# Patient Record
Sex: MALE | Race: WHITE | NOT HISPANIC OR LATINO
[De-identification: names, ages, dates, MRNs, and addresses within clinical notes are randomized per-mention and may not be internally consistent; named-entity substitution may affect disease eponyms.]

---

## 2020-05-13 ENCOUNTER — TRANSCRIPTION ENCOUNTER (OUTPATIENT)
Age: 35
End: 2020-05-13

## 2020-05-14 ENCOUNTER — OUTPATIENT (OUTPATIENT)
Dept: OUTPATIENT SERVICES | Facility: HOSPITAL | Age: 35
LOS: 1 days | End: 2020-05-14

## 2020-05-14 DIAGNOSIS — Z11.59 ENCOUNTER FOR SCREENING FOR OTHER VIRAL DISEASES: ICD-10-CM

## 2020-05-15 DIAGNOSIS — Z11.59 ENCOUNTER FOR SCREENING FOR OTHER VIRAL DISEASES: ICD-10-CM

## 2020-07-20 ENCOUNTER — TRANSCRIPTION ENCOUNTER (OUTPATIENT)
Age: 35
End: 2020-07-20

## 2020-09-27 ENCOUNTER — TRANSCRIPTION ENCOUNTER (OUTPATIENT)
Age: 35
End: 2020-09-27

## 2021-10-11 ENCOUNTER — APPOINTMENT (OUTPATIENT)
Dept: INTERNAL MEDICINE | Facility: CLINIC | Age: 36
End: 2021-10-11
Payer: COMMERCIAL

## 2021-10-11 ENCOUNTER — NON-APPOINTMENT (OUTPATIENT)
Age: 36
End: 2021-10-11

## 2021-10-11 VITALS
DIASTOLIC BLOOD PRESSURE: 100 MMHG | HEART RATE: 82 BPM | RESPIRATION RATE: 14 BRPM | SYSTOLIC BLOOD PRESSURE: 154 MMHG | HEIGHT: 72 IN | WEIGHT: 277 LBS | TEMPERATURE: 97.6 F | BODY MASS INDEX: 37.52 KG/M2 | OXYGEN SATURATION: 98 %

## 2021-10-11 VITALS — SYSTOLIC BLOOD PRESSURE: 164 MMHG | DIASTOLIC BLOOD PRESSURE: 100 MMHG

## 2021-10-11 DIAGNOSIS — Z82.49 FAMILY HISTORY OF ISCHEMIC HEART DISEASE AND OTHER DISEASES OF THE CIRCULATORY SYSTEM: ICD-10-CM

## 2021-10-11 DIAGNOSIS — Z78.9 OTHER SPECIFIED HEALTH STATUS: ICD-10-CM

## 2021-10-11 PROCEDURE — 99385 PREV VISIT NEW AGE 18-39: CPT | Mod: 25

## 2021-10-11 PROCEDURE — 93000 ELECTROCARDIOGRAM COMPLETE: CPT | Mod: 59

## 2021-10-13 LAB
ALBUMIN SERPL ELPH-MCNC: 5 G/DL
ALP BLD-CCNC: 71 U/L
ALT SERPL-CCNC: 38 U/L
ANION GAP SERPL CALC-SCNC: 15 MMOL/L
APPEARANCE: CLEAR
AST SERPL-CCNC: 18 U/L
BASOPHILS # BLD AUTO: 0.07 K/UL
BASOPHILS NFR BLD AUTO: 0.8 %
BILIRUB SERPL-MCNC: 0.2 MG/DL
BILIRUBIN URINE: NEGATIVE
BLOOD URINE: NEGATIVE
BUN SERPL-MCNC: 13 MG/DL
CALCIUM SERPL-MCNC: 9.8 MG/DL
CHLORIDE SERPL-SCNC: 103 MMOL/L
CHOLEST SERPL-MCNC: 225 MG/DL
CO2 SERPL-SCNC: 25 MMOL/L
COLOR: NORMAL
COVID-19 NUCLEOCAPSID  GAM ANTIBODY INTERPRETATION: NEGATIVE
COVID-19 SPIKE DOMAIN ANTIBODY INTERPRETATION: POSITIVE
CREAT SERPL-MCNC: 0.93 MG/DL
EOSINOPHIL # BLD AUTO: 0.24 K/UL
EOSINOPHIL NFR BLD AUTO: 2.7 %
ESTIMATED AVERAGE GLUCOSE: 126 MG/DL
FOLATE SERPL-MCNC: 4.8 NG/ML
GLUCOSE QUALITATIVE U: NEGATIVE
GLUCOSE SERPL-MCNC: 81 MG/DL
HBA1C MFR BLD HPLC: 6 %
HCT VFR BLD CALC: 48.4 %
HDLC SERPL-MCNC: 47 MG/DL
HGB BLD-MCNC: 15.3 G/DL
IMM GRANULOCYTES NFR BLD AUTO: 0.4 %
KETONES URINE: NEGATIVE
LDLC SERPL CALC-MCNC: 117 MG/DL
LEUKOCYTE ESTERASE URINE: NEGATIVE
LYMPHOCYTES # BLD AUTO: 2.65 K/UL
LYMPHOCYTES NFR BLD AUTO: 29.8 %
MAN DIFF?: NORMAL
MCHC RBC-ENTMCNC: 29.3 PG
MCHC RBC-ENTMCNC: 31.6 GM/DL
MCV RBC AUTO: 92.5 FL
MONOCYTES # BLD AUTO: 1.02 K/UL
MONOCYTES NFR BLD AUTO: 11.5 %
NEUTROPHILS # BLD AUTO: 4.88 K/UL
NEUTROPHILS NFR BLD AUTO: 54.8 %
NITRITE URINE: NEGATIVE
NONHDLC SERPL-MCNC: 178 MG/DL
PH URINE: 6
PLATELET # BLD AUTO: 291 K/UL
POTASSIUM SERPL-SCNC: 4.8 MMOL/L
PROT SERPL-MCNC: 7.6 G/DL
PROTEIN URINE: NORMAL
PSA SERPL-MCNC: 1.49 NG/ML
RBC # BLD: 5.23 M/UL
RBC # FLD: 13.1 %
SARS-COV-2 AB SERPL IA-ACNC: 7.49 U/ML
SARS-COV-2 AB SERPL QL IA: 0.07 INDEX
SODIUM SERPL-SCNC: 143 MMOL/L
SPECIFIC GRAVITY URINE: 1.02
TRIGL SERPL-MCNC: 303 MG/DL
TSH SERPL-ACNC: 2.49 UIU/ML
UROBILINOGEN URINE: NORMAL
VIT B12 SERPL-MCNC: 501 PG/ML
WBC # FLD AUTO: 8.9 K/UL

## 2021-10-13 NOTE — HEALTH RISK ASSESSMENT
[Good] : ~his/her~ current health as good [Very Good] : ~his/her~  mood as very good [Yes] : Yes [] : No [de-identified] : social [de-identified] : Not exercising much

## 2021-10-13 NOTE — PLAN
[FreeTextEntry1] : Start Lisinopril 10mg QD\par See [facility]\par RTO 2 weeks

## 2021-10-13 NOTE — HISTORY OF PRESENT ILLNESS
[FreeTextEntry1] : Here for CPE\par Pt is c/o chronic right ankle/foot pain since spraining his ankle January 2021.\par Also c/o chronic Right shoulder pain\par Pt has received J&J COVID vaccine. [de-identified] : Doesn't smoke. Social alcohol.\par Doesn't exercise much.\par

## 2021-10-26 ENCOUNTER — APPOINTMENT (OUTPATIENT)
Dept: INTERNAL MEDICINE | Facility: CLINIC | Age: 36
End: 2021-10-26
Payer: COMMERCIAL

## 2021-10-26 VITALS — DIASTOLIC BLOOD PRESSURE: 70 MMHG | SYSTOLIC BLOOD PRESSURE: 110 MMHG

## 2021-10-26 VITALS
HEART RATE: 81 BPM | RESPIRATION RATE: 15 BRPM | OXYGEN SATURATION: 97 % | DIASTOLIC BLOOD PRESSURE: 90 MMHG | SYSTOLIC BLOOD PRESSURE: 120 MMHG | TEMPERATURE: 97.3 F

## 2021-10-26 DIAGNOSIS — E78.2 MIXED HYPERLIPIDEMIA: ICD-10-CM

## 2021-10-26 PROCEDURE — 99213 OFFICE O/P EST LOW 20 MIN: CPT

## 2021-10-26 NOTE — PLAN
[FreeTextEntry1] : Continue Lisinopril 10mg QD\par Trial of improved diet/exercise and weight loss to imrove A1C and cholesterol\par RTO 3 months

## 2021-10-26 NOTE — HISTORY OF PRESENT ILLNESS
[FreeTextEntry1] : Here for follow-up regarding HTN, elevated A1C, and elevated cholesterol'\par Lisinopril 10mg QD was started at the last visit. [de-identified] : Overall feeling well

## 2021-11-03 ENCOUNTER — APPOINTMENT (OUTPATIENT)
Dept: ORTHOPEDIC SURGERY | Facility: CLINIC | Age: 36
End: 2021-11-03
Payer: COMMERCIAL

## 2021-11-03 ENCOUNTER — NON-APPOINTMENT (OUTPATIENT)
Age: 36
End: 2021-11-03

## 2021-11-03 DIAGNOSIS — M79.671 PAIN IN RIGHT FOOT: ICD-10-CM

## 2021-11-03 PROCEDURE — 99204 OFFICE O/P NEW MOD 45 MIN: CPT

## 2021-11-03 PROCEDURE — 73630 X-RAY EXAM OF FOOT: CPT | Mod: RT

## 2021-11-03 NOTE — HISTORY OF PRESENT ILLNESS
[FreeTextEntry1] : 11/3/2021: MARI PERRY is a 36 year old male presenting for an initial evaluation of right foot pain ongoing for the past 3 months. The patient’s pain is noted to be a 4/10, with an intermittent timing. He is concerned with swelling to the ankle. Denies any acute trauma or injury to the foot. He does confirm a prior sprain to the ankle approximately four years prior and was treated conservatively. The patient confirms a medical history of Pre-diabetes with his most recent A1c of 6.0. He presents to the clinic wearing [employer] sneakers. No other complaints. \par

## 2021-11-03 NOTE — PHYSICAL EXAM
[de-identified] : General: Alert and oriented x3. In no acute distress. Pleasant in nature with a normal affect. No apparent respiratory distress.\par \par Right Foot Exam\par Skin: Clean, dry, intact\par Inspection: No obvious malalignment, no masses, no swelling, no effusion. Pes planus. \par Pulses: 2+ DP/PT pulses\par ROM: FOOT Full  ROM of digits, ANKLE 10 degrees of dorsiflexion, 40 degrees of plantarflexion, 10 degrees of subtalar motion.\par Painful ROM: None\par Tenderness: + Medial talus pain. No tenderness over the medial malleolus, No tenderness over the lateral malleolus, no CFL/ATFL/PTFL pain, no deltoid ligament pain. No heel pain. No Achilles tenderness. No 5th metatarsal pain. No pain to the LisFranc joint. No ttp over the posterior tibial tendon.\par Stability: Negative anterior/posterior drawer.\par Strength: 5/5 ADD/ABD/TA/GS/EHL/FHL/EDL\par Neuro: Sensation in tact to light touch throughout\par Additional tests: Negative Mortons test, negative tarsal tunnel tinels, negative single heel rise.			 [de-identified] : 3V of the right foot were ordered, obtained, and reviewed by me today, 11/03/2021, revealed: No acute fractures. Pes planus. \par

## 2021-11-03 NOTE — ADDENDUM
[FreeTextEntry1] : I, Susana Saul, acted solely as a scribe for Dr. Micky Kaufman on this date 11/03/2021.\par \par All medical record entries made by the Scribe were at my, Dr. Micky Kaufman, direction and personally dictated by me on 11/03/2021 . I have reviewed the chart and agree that the record accurately reflects my personal performance of the history, physical exam, assessment and plan. I have also personally directed, reviewed, and agreed with the chart.	\par

## 2021-11-03 NOTE — DISCUSSION/SUMMARY
[de-identified] : Today I had a lengthy discussion with the patient regarding their right foot pain. I have addressed all the patient's concerns surrounding the pathology of their condition. XR imaging was completed in office today and results were reviewed with the patient. I recommend the patient undergo a course of physical therapy for the right foot and ankle  2-3 times a week for a total of 6-8 weeks. A prescription was given for the physical therapy today. I recommended that the patient utilize an ASO brace. The patient was fitted for the ASO brace in the office today.\par \par A discussion was also had about utilizing custom orthotics versus OTC orthotics. The patient was educated about custom orthotics in the office today.\par 	\par Further, I recommend that the patient utilize ice, NSAIDs, and heat PRN. They can also elevate their right foot above the level of the heart.		\par \par The patient understood and verbally agreed to the treatment plan. All of their questions were answered and they were satisfied with the visit. The patient should call the office if they have any questions or experience worsening symptoms. I would like to see the patient back in the office in 2-3 months PRN to reassess their condition. 				\par

## 2022-01-25 ENCOUNTER — APPOINTMENT (OUTPATIENT)
Dept: INTERNAL MEDICINE | Facility: CLINIC | Age: 37
End: 2022-01-25
Payer: COMMERCIAL

## 2022-01-25 VITALS
BODY MASS INDEX: 33.46 KG/M2 | WEIGHT: 247 LBS | OXYGEN SATURATION: 97 % | RESPIRATION RATE: 14 BRPM | HEART RATE: 82 BPM | DIASTOLIC BLOOD PRESSURE: 70 MMHG | HEIGHT: 72 IN | SYSTOLIC BLOOD PRESSURE: 116 MMHG

## 2022-01-25 VITALS — DIASTOLIC BLOOD PRESSURE: 74 MMHG | SYSTOLIC BLOOD PRESSURE: 116 MMHG

## 2022-01-25 PROCEDURE — 99213 OFFICE O/P EST LOW 20 MIN: CPT

## 2022-01-25 NOTE — HISTORY OF PRESENT ILLNESS
[FreeTextEntry1] : Here for follow-up regarding HTN.\par Lisinopril 10mg QD was started at the last visit [de-identified] : Feeling well

## 2022-02-17 ENCOUNTER — APPOINTMENT (OUTPATIENT)
Dept: ORTHOPEDIC SURGERY | Facility: CLINIC | Age: 37
End: 2022-02-17
Payer: COMMERCIAL

## 2022-02-17 DIAGNOSIS — M76.821 POSTERIOR TIBIAL TENDINITIS, RIGHT LEG: ICD-10-CM

## 2022-02-17 DIAGNOSIS — M21.41 FLAT FOOT [PES PLANUS] (ACQUIRED), RIGHT FOOT: ICD-10-CM

## 2022-02-17 DIAGNOSIS — M79.671 PAIN IN RIGHT FOOT: ICD-10-CM

## 2022-02-17 PROCEDURE — 99212 OFFICE O/P EST SF 10 MIN: CPT

## 2022-02-17 NOTE — DISCUSSION/SUMMARY
[de-identified] : Assessment: Right foot injury, posterior tib tendonitis.\par \par Plan:\par 1. Home exercise and stretching program.  ASO brace with Dr. Raymond's arch support orthotics.\par 2. NSAIDs/Tylenol as needed for pain. \par 3. Return to normal activities as tolerated. \par 4. Continue with ice and heat therapy. \par 5. All questions answered.  Follow-up as needed. If pain persists consider advanced imaging in the future.  The patient understood the treatment plan.

## 2022-02-17 NOTE — PHYSICAL EXAM
[de-identified] : 3V of the right foot were ordered, obtained, and reviewed, 11/03/2021, revealed: No acute fractures. Pes planus. \par  [de-identified] : General: Alert and oriented x3. In no acute distress. Pleasant in nature with a normal affect. No apparent respiratory distress.\par \par Right Foot Exam\par Skin: Clean, dry, intact\par Inspection: No obvious malalignment, no masses, no swelling, no effusion. Pes planus. \par Pulses: 2+ DP/PT pulses\par ROM: FOOT Full  ROM of digits, ANKLE 10 degrees of dorsiflexion, 40 degrees of plantarflexion, 10 degrees of subtalar motion.\par Painful ROM: None\par Tenderness: No Medial talus pain. No tenderness over the medial malleolus, No tenderness over the lateral malleolus, no CFL/ATFL/PTFL pain, no deltoid ligament pain. No heel pain. No Achilles tenderness. No 5th metatarsal pain. No pain to the LisFranc joint. + ttp over the posterior tibial tendon.\par Stability: Negative anterior/posterior drawer.\par Strength: 5/5 ADD/ABD/TA/GS/EHL/FHL/EDL\par Neuro: Sensation in tact to light touch throughout\par Additional tests: Negative Mortons test, negative tarsal tunnel tinels, negative single heel rise.

## 2022-02-17 NOTE — HISTORY OF PRESENT ILLNESS
[FreeTextEntry1] : 2/17/22: The patient is here for follow-up of his right foot pain.  He does state that since the last office visit with the brace, over-the-counter arch support Dr. Raymond's orthotics, his pain is subsiding.  He wants to slowly get back into exercising such as running.  His pain scale today 1 out of 10.  No other complaints.\par \par 11/3/2021: MARI PERRY is a 36 year old male presenting for an initial evaluation of right foot pain ongoing for the past 3 months. The patient’s pain is noted to be a 4/10, with an intermittent timing. He is concerned with swelling to the ankle. Denies any acute trauma or injury to the foot. He does confirm a prior sprain to the ankle approximately four years prior and was treated conservatively. The patient confirms a medical history of Pre-diabetes with his most recent A1c of 6.0. He presents to the clinic wearing [employer] sneakers. No other complaints. \par

## 2022-07-26 ENCOUNTER — APPOINTMENT (OUTPATIENT)
Dept: INTERNAL MEDICINE | Facility: CLINIC | Age: 37
End: 2022-07-26

## 2022-07-26 VITALS
HEIGHT: 72 IN | HEART RATE: 79 BPM | BODY MASS INDEX: 33.86 KG/M2 | SYSTOLIC BLOOD PRESSURE: 120 MMHG | WEIGHT: 250 LBS | OXYGEN SATURATION: 98 % | DIASTOLIC BLOOD PRESSURE: 72 MMHG | RESPIRATION RATE: 14 BRPM

## 2022-07-26 DIAGNOSIS — M25.512 PAIN IN LEFT SHOULDER: ICD-10-CM

## 2022-07-26 DIAGNOSIS — M25.511 PAIN IN RIGHT SHOULDER: ICD-10-CM

## 2022-07-26 PROCEDURE — 99214 OFFICE O/P EST MOD 30 MIN: CPT

## 2022-07-26 NOTE — HISTORY OF PRESENT ILLNESS
[FreeTextEntry1] : Here for follow-up\par regarding HTN [de-identified] : Feeling well\par Has chronic discomfort in both shoulders

## 2023-07-18 ENCOUNTER — RX RENEWAL (OUTPATIENT)
Age: 38
End: 2023-07-18

## 2024-04-22 ENCOUNTER — RX RENEWAL (OUTPATIENT)
Age: 39
End: 2024-04-22

## 2024-06-27 ENCOUNTER — NON-APPOINTMENT (OUTPATIENT)
Age: 39
End: 2024-06-27

## 2024-06-27 ENCOUNTER — APPOINTMENT (OUTPATIENT)
Dept: INTERNAL MEDICINE | Facility: CLINIC | Age: 39
End: 2024-06-27
Payer: COMMERCIAL

## 2024-06-27 VITALS
HEIGHT: 72 IN | WEIGHT: 262 LBS | HEART RATE: 76 BPM | TEMPERATURE: 98.7 F | SYSTOLIC BLOOD PRESSURE: 140 MMHG | OXYGEN SATURATION: 98 % | RESPIRATION RATE: 16 BRPM | BODY MASS INDEX: 35.49 KG/M2 | DIASTOLIC BLOOD PRESSURE: 92 MMHG

## 2024-06-27 VITALS — SYSTOLIC BLOOD PRESSURE: 148 MMHG | DIASTOLIC BLOOD PRESSURE: 100 MMHG

## 2024-06-27 DIAGNOSIS — I10 ESSENTIAL (PRIMARY) HYPERTENSION: ICD-10-CM

## 2024-06-27 DIAGNOSIS — Z00.00 ENCOUNTER FOR GENERAL ADULT MEDICAL EXAMINATION W/OUT ABNORMAL FINDINGS: ICD-10-CM

## 2024-06-27 DIAGNOSIS — R73.09 OTHER ABNORMAL GLUCOSE: ICD-10-CM

## 2024-06-27 PROCEDURE — 99396 PREV VISIT EST AGE 40-64: CPT | Mod: 25

## 2024-06-27 PROCEDURE — 36415 COLL VENOUS BLD VENIPUNCTURE: CPT

## 2024-06-27 PROCEDURE — 93000 ELECTROCARDIOGRAM COMPLETE: CPT

## 2024-06-29 LAB
ALBUMIN SERPL ELPH-MCNC: 4.5 G/DL
ALP BLD-CCNC: 63 U/L
ALT SERPL-CCNC: 49 U/L
ANION GAP SERPL CALC-SCNC: 14 MMOL/L
APPEARANCE: CLEAR
AST SERPL-CCNC: 36 U/L
BILIRUB SERPL-MCNC: 0.4 MG/DL
BILIRUBIN URINE: NEGATIVE
BLOOD URINE: NEGATIVE
BUN SERPL-MCNC: 13 MG/DL
CALCIUM SERPL-MCNC: 9.4 MG/DL
CHLORIDE SERPL-SCNC: 105 MMOL/L
CHOLEST SERPL-MCNC: 204 MG/DL
CO2 SERPL-SCNC: 23 MMOL/L
COLOR: YELLOW
CREAT SERPL-MCNC: 0.96 MG/DL
EGFR: 102 ML/MIN/1.73M2
ESTIMATED AVERAGE GLUCOSE: 120 MG/DL
FOLATE SERPL-MCNC: 8.7 NG/ML
GLUCOSE QUALITATIVE U: NEGATIVE MG/DL
GLUCOSE SERPL-MCNC: 98 MG/DL
HBA1C MFR BLD HPLC: 5.8 %
HCT VFR BLD CALC: 44.2 %
HDLC SERPL-MCNC: 50 MG/DL
HGB BLD-MCNC: 14.9 G/DL
KETONES URINE: NEGATIVE MG/DL
LDLC SERPL CALC-MCNC: 137 MG/DL
LEUKOCYTE ESTERASE URINE: NEGATIVE
MCHC RBC-ENTMCNC: 29.8 PG
MCHC RBC-ENTMCNC: 33.7 GM/DL
MCV RBC AUTO: 88.4 FL
NITRITE URINE: NEGATIVE
NONHDLC SERPL-MCNC: 154 MG/DL
PH URINE: 6
PLATELET # BLD AUTO: 287 K/UL
POTASSIUM SERPL-SCNC: 4.4 MMOL/L
PROT SERPL-MCNC: 7 G/DL
PROTEIN URINE: NORMAL MG/DL
PSA SERPL-MCNC: 1.61 NG/ML
RBC # BLD: 5 M/UL
RBC # FLD: 13.6 %
SODIUM SERPL-SCNC: 142 MMOL/L
SPECIFIC GRAVITY URINE: 1.02
TRIGL SERPL-MCNC: 89 MG/DL
TSH SERPL-ACNC: 1.88 UIU/ML
UROBILINOGEN URINE: 0.2 MG/DL
VIT B12 SERPL-MCNC: 497 PG/ML
WBC # FLD AUTO: 6.95 K/UL

## 2024-07-11 ENCOUNTER — APPOINTMENT (OUTPATIENT)
Dept: INTERNAL MEDICINE | Facility: CLINIC | Age: 39
End: 2024-07-11
Payer: COMMERCIAL

## 2024-07-11 VITALS
SYSTOLIC BLOOD PRESSURE: 136 MMHG | HEIGHT: 72 IN | DIASTOLIC BLOOD PRESSURE: 74 MMHG | WEIGHT: 258 LBS | RESPIRATION RATE: 14 BRPM | OXYGEN SATURATION: 97 % | BODY MASS INDEX: 34.95 KG/M2 | HEART RATE: 64 BPM

## 2024-07-11 DIAGNOSIS — I10 ESSENTIAL (PRIMARY) HYPERTENSION: ICD-10-CM

## 2024-07-11 PROCEDURE — 99213 OFFICE O/P EST LOW 20 MIN: CPT

## 2024-08-05 ENCOUNTER — NON-APPOINTMENT (OUTPATIENT)
Age: 39
End: 2024-08-05

## 2024-08-05 ENCOUNTER — APPOINTMENT (OUTPATIENT)
Dept: OTOLARYNGOLOGY | Facility: CLINIC | Age: 39
End: 2024-08-05

## 2024-08-05 PROBLEM — J34.2 DEVIATED NASAL SEPTUM: Status: ACTIVE | Noted: 2024-08-05

## 2024-08-05 PROBLEM — K14.8 TONGUE LESION: Status: ACTIVE | Noted: 2024-08-05

## 2024-08-05 PROBLEM — J31.0 CHRONIC RHINITIS: Status: ACTIVE | Noted: 2024-08-05

## 2024-08-05 PROBLEM — H61.21 IMPACTED CERUMEN OF RIGHT EAR: Status: ACTIVE | Noted: 2024-08-05

## 2024-08-05 PROCEDURE — 99204 OFFICE O/P NEW MOD 45 MIN: CPT | Mod: 25

## 2024-08-05 PROCEDURE — 41110 EXCISION OF TONGUE LESION: CPT

## 2024-08-05 PROCEDURE — 69210 REMOVE IMPACTED EAR WAX UNI: CPT | Mod: RT

## 2024-08-05 NOTE — PROCEDURE
[FreeTextEntry1] : Tongue Lesion Removal [FreeTextEntry2] : Tongue lesion [FreeTextEntry3] : The tongue was sprays with Hurricaine topical anesthetic spray. Then the tongue was injected with 2cc of 2% lidocaine 1:100,000 epinephrine. Scissors and clamps used to remove tongue lesion from the tip of the tongue. Removed tissue sent to lab for a biopsy. Bleeding was controlled using electrocautery. Ice applied to the cauterization site. Patient tolerated the procedure well.

## 2024-08-05 NOTE — ADDENDUM
[FreeTextEntry1] :  Documented by Freddie Sarabia acting as scribe for Dr. Bright on 08/05/2024. All Medical record entries made by the Scribe were at my, Dr. Bright, direction and personally dictated by me on 08/05/2024 . I have reviewed the chart and agree that the record accurately reflects my personal performance of the history, physical exam, assessment and plan. I have also personally directed, reviewed, and agreed with the discharge instructions.

## 2024-08-05 NOTE — CONSULT LETTER
[Dear  ___] : Dear  [unfilled], [Consult Letter:] : I had the pleasure of evaluating your patient, [unfilled]. [Please see my note below.] : Please see my note below. [Consult Closing:] : Thank you very much for allowing me to participate in the care of this patient.  If you have any questions, please do not hesitate to contact me. [Sincerely,] : Sincerely, [FreeTextEntry3] :  Beau Bright MD FACS

## 2024-08-05 NOTE — PHYSICAL EXAM
[Hearing Salazar Test (Tuning Fork On Forehead)] : no lateralization of tone [] : septum deviated bilaterally [Midline] : trachea located in midline position [Normal] : no rashes [de-identified] : enlarged submandibular glands, but symmetric [Hearing Loss Right Only] : normal [Hearing Loss Left Only] : normal [FreeTextEntry8] :  cerumen impaction removed via curettage [FreeTextEntry9] :  cerumen removed via curettage [de-identified] : inflamed turbinates [de-identified] : granuloma at the tip of the tongue

## 2024-08-05 NOTE — ASSESSMENT
[FreeTextEntry1] : Reviewed and reconciled medications, allergies, PMHx, PSHx, SocHx, FMHx.  Patient presents today stating that he bit his tongue over a year ago and now he has a bump on his tongue. He states that the bump came about soon after he bit his tongue, and denies significant growth over the past year. He states that sometimes he has difficulty hearing when he is swimming. He denies abnormal bleeding. He states that he had a few nosebleeds over the winter.   Physical exam: -right ear canal: cerumen impaction removed via curettage -left ear canal: cerumen removed via curettage -no lateralization to tuning forks -deviated septum -inflamed turbinates -granuloma at the tip of the tongue -enlarged submandibular glands, but symmetric  ENT Procedure: Tongue Lesion Removal with Tissue Biopsy  Plan: tissue biopsy taken - results pending -Use Tylenol or Advil for pain as needed -FU as needed

## 2024-08-05 NOTE — HISTORY OF PRESENT ILLNESS
[de-identified] : Patient presents today stating that he bit his tongue over a year ago and now he has a bump on his tongue. He states that the bump came about soon after he bit his tongue, and denies significant growth over the past year. He states that sometimes he has difficulty hearing when he is swimming. He denies abnormal bleeding. He states that he had a few nosebleeds over the winter.

## 2024-08-07 ENCOUNTER — APPOINTMENT (OUTPATIENT)
Dept: PULMONOLOGY | Facility: CLINIC | Age: 39
End: 2024-08-07

## 2024-08-07 PROBLEM — Z82.0 FAMILY HISTORY OF AMYOTROPHIC LATERAL SCLEROSIS: Status: ACTIVE | Noted: 2024-08-07

## 2024-08-07 PROBLEM — G47.33 OSA (OBSTRUCTIVE SLEEP APNEA): Status: ACTIVE | Noted: 2024-08-07

## 2024-08-07 PROBLEM — Z82.0 FAMILY HISTORY OF SLEEP APNEA: Status: ACTIVE | Noted: 2024-08-07

## 2024-08-07 PROCEDURE — 99203 OFFICE O/P NEW LOW 30 MIN: CPT

## 2024-08-07 PROCEDURE — G2211 COMPLEX E/M VISIT ADD ON: CPT | Mod: NC

## 2024-08-07 NOTE — HISTORY OF PRESENT ILLNESS
[Awakes with Dry Mouth] : awakes with dry mouth [Difficulty Maintaining Sleep] : difficulty maintaining sleep [Snoring] : snoring [Tired while Driving] : tired while driving [Witnessed Apneas] : witnessed apneas [TextBox_4] : Here for initial sleep apnea evaluation Reports snoring daytime sleepiness and nonrestorative sleep Interested in CPAP Mother had sleep apnea was on CPAP briefly and then was diagnosed with ALS [Awakes Unrefreshed] : does not awaken unrefreshed [Awakes with Headache] : does not awaken with headache [Recent  Weight Gain] : no recent weight gain [Unusual Sleep Behavior] : no unusual sleep behavior [Vivid dreams] : no vivid dreams

## 2024-08-20 ENCOUNTER — APPOINTMENT (OUTPATIENT)
Dept: PULMONOLOGY | Facility: CLINIC | Age: 39
End: 2024-08-20
Payer: COMMERCIAL

## 2024-08-20 PROCEDURE — 95800 SLP STDY UNATTENDED: CPT

## 2024-08-22 PROCEDURE — 95800 SLP STDY UNATTENDED: CPT

## 2024-09-04 ENCOUNTER — APPOINTMENT (OUTPATIENT)
Dept: PULMONOLOGY | Facility: CLINIC | Age: 39
End: 2024-09-04
Payer: COMMERCIAL

## 2024-09-04 DIAGNOSIS — G47.33 OBSTRUCTIVE SLEEP APNEA (ADULT) (PEDIATRIC): ICD-10-CM

## 2024-09-04 PROCEDURE — 99213 OFFICE O/P EST LOW 20 MIN: CPT

## 2024-09-04 PROCEDURE — G2211 COMPLEX E/M VISIT ADD ON: CPT | Mod: NC

## 2024-09-04 NOTE — REASON FOR VISIT
[Home] : at home, [unfilled] , at the time of the visit. [Medical Office: ([facility])___] : at the medical office located in

## 2024-09-04 NOTE — ASSESSMENT
[FreeTextEntry1] :  Discussed effects of untreated HELENA, including but not limited to: 1) high blood pressure,     2) heart attacks, 3) diabetes, 4) irregular heart beats, 5) strokes, 6) diabetes, 7)     increased risk of death.           Cautioned against driving while sleepy.  Results of sleep study reviewed with patient. Treatment options including weight loss oral appliance therapy and PAP therapy were reviewed with patient. After careful review of sleep study patient desire and risk factors recommend initial therapy with PAP. Will order auto titrating device.  Advised telehealth visit 1 week after receiving device

## 2024-09-04 NOTE — HISTORY OF PRESENT ILLNESS
[TextBox_4] : Followup for sleep apnea Telehealth visit to review results of home sleep study. No change in history as reported on initial evaluation

## 2024-09-25 ENCOUNTER — APPOINTMENT (OUTPATIENT)
Dept: PULMONOLOGY | Facility: CLINIC | Age: 39
End: 2024-09-25
Payer: COMMERCIAL

## 2024-09-25 DIAGNOSIS — G47.33 OBSTRUCTIVE SLEEP APNEA (ADULT) (PEDIATRIC): ICD-10-CM

## 2024-09-25 PROCEDURE — 99212 OFFICE O/P EST SF 10 MIN: CPT

## 2024-09-25 PROCEDURE — G2211 COMPLEX E/M VISIT ADD ON: CPT | Mod: NC

## 2024-09-25 NOTE — HISTORY OF PRESENT ILLNESS
[TextBox_4] : Telehealth visit for initial sleep apnea follow-up received auto CPAP.  Had some initial difficulty using it but now getting deeper sleep with it.  No specific concerns. Current setting 8-15 Data download demonstrates excellent compliance AHI less than 5 Advised in office appointment in 1 month

## 2024-09-25 NOTE — REASON FOR VISIT
[Home] : at home, [unfilled] , at the time of the visit. [Medical Office: ([facility])___] : at the medical office located in

## 2024-10-14 ENCOUNTER — NON-APPOINTMENT (OUTPATIENT)
Age: 39
End: 2024-10-14

## 2024-11-05 ENCOUNTER — NON-APPOINTMENT (OUTPATIENT)
Age: 39
End: 2024-11-05

## 2024-11-07 ENCOUNTER — APPOINTMENT (OUTPATIENT)
Dept: PULMONOLOGY | Facility: CLINIC | Age: 39
End: 2024-11-07
Payer: COMMERCIAL

## 2024-11-07 VITALS — HEART RATE: 83 BPM | DIASTOLIC BLOOD PRESSURE: 87 MMHG | OXYGEN SATURATION: 97 % | SYSTOLIC BLOOD PRESSURE: 123 MMHG

## 2024-11-07 DIAGNOSIS — G47.33 OBSTRUCTIVE SLEEP APNEA (ADULT) (PEDIATRIC): ICD-10-CM

## 2024-11-07 PROCEDURE — G2211 COMPLEX E/M VISIT ADD ON: CPT | Mod: NC

## 2024-11-07 PROCEDURE — 99213 OFFICE O/P EST LOW 20 MIN: CPT

## 2025-02-04 ENCOUNTER — NON-APPOINTMENT (OUTPATIENT)
Age: 40
End: 2025-02-04

## 2025-03-05 ENCOUNTER — RX RENEWAL (OUTPATIENT)
Age: 40
End: 2025-03-05

## 2025-04-18 ENCOUNTER — NON-APPOINTMENT (OUTPATIENT)
Age: 40
End: 2025-04-18

## 2025-07-30 ENCOUNTER — NON-APPOINTMENT (OUTPATIENT)
Age: 40
End: 2025-07-30

## 2025-08-01 ENCOUNTER — APPOINTMENT (OUTPATIENT)
Dept: INTERNAL MEDICINE | Facility: CLINIC | Age: 40
End: 2025-08-01

## 2025-08-01 ENCOUNTER — NON-APPOINTMENT (OUTPATIENT)
Age: 40
End: 2025-08-01

## 2025-09-13 ENCOUNTER — RX RENEWAL (OUTPATIENT)
Age: 40
End: 2025-09-13